# Patient Record
Sex: MALE | Race: BLACK OR AFRICAN AMERICAN | NOT HISPANIC OR LATINO
[De-identification: names, ages, dates, MRNs, and addresses within clinical notes are randomized per-mention and may not be internally consistent; named-entity substitution may affect disease eponyms.]

---

## 2021-03-05 PROBLEM — Z00.00 ENCOUNTER FOR PREVENTIVE HEALTH EXAMINATION: Status: ACTIVE | Noted: 2021-03-05

## 2021-03-19 ENCOUNTER — APPOINTMENT (OUTPATIENT)
Dept: UROLOGY | Facility: CLINIC | Age: 41
End: 2021-03-19
Payer: MEDICAID

## 2021-03-19 VITALS — HEART RATE: 72 BPM | DIASTOLIC BLOOD PRESSURE: 81 MMHG | SYSTOLIC BLOOD PRESSURE: 145 MMHG

## 2021-03-19 VITALS
DIASTOLIC BLOOD PRESSURE: 100 MMHG | HEART RATE: 67 BPM | BODY MASS INDEX: 33.27 KG/M2 | WEIGHT: 207 LBS | HEIGHT: 66 IN | SYSTOLIC BLOOD PRESSURE: 139 MMHG | TEMPERATURE: 98.2 F

## 2021-03-19 DIAGNOSIS — E29.1 TESTICULAR HYPOFUNCTION: ICD-10-CM

## 2021-03-19 DIAGNOSIS — M43.10 SPONDYLOLISTHESIS, SITE UNSPECIFIED: ICD-10-CM

## 2021-03-19 DIAGNOSIS — Z86.79 PERSONAL HISTORY OF OTHER DISEASES OF THE CIRCULATORY SYSTEM: ICD-10-CM

## 2021-03-19 PROCEDURE — 99204 OFFICE O/P NEW MOD 45 MIN: CPT

## 2021-03-19 PROCEDURE — 99072 ADDL SUPL MATRL&STAF TM PHE: CPT

## 2021-03-19 NOTE — HISTORY OF PRESENT ILLNESS
[FreeTextEntry1] : 41M comes in for fertility evaluaiton. failure to conceive x 9 months. girlfriend is 42F. 3 children for girlfreiend. no male factor children. patient reports pregnagncy at age 13 ended in . no previous semen testing. negative female factor evalaution per his report. no ED. no ejauclatory dysufnciton. good libido. no additional compaltins. \par no familty history prostate kdieny bladder canc.er\par

## 2021-03-19 NOTE — PHYSICAL EXAM
[General Appearance - Well Developed] : well developed [General Appearance - Well Nourished] : well nourished [Normal Appearance] : normal appearance [Well Groomed] : well groomed [Heart Rate And Rhythm] : Heart rate and rhythm were normal [Abdomen Soft] : soft [Abdomen Tenderness] : non-tender [Abdomen Hernia] : no hernia was discovered [Urethral Meatus] : meatus normal [Penis Abnormality] : normal uncircumcised penis [Urinary Bladder Findings] : the bladder was normal on palpation [Scrotum] : the scrotum was normal [FreeTextEntry1] : 18-20cc frim testes bialterally. ?grade I left varicocele.

## 2021-03-19 NOTE — ASSESSMENT
[FreeTextEntry1] : hypogonadism\par partner is 42\par recommend ART\par small varioccele\par hormonal eval\par semen analysis\par scrotal ultraound\par f/u 1 motnh

## 2021-03-21 ENCOUNTER — OUTPATIENT (OUTPATIENT)
Dept: OUTPATIENT SERVICES | Facility: HOSPITAL | Age: 41
LOS: 1 days | End: 2021-03-21
Payer: MEDICAID

## 2021-03-21 ENCOUNTER — RESULT REVIEW (OUTPATIENT)
Age: 41
End: 2021-03-21

## 2021-03-21 ENCOUNTER — APPOINTMENT (OUTPATIENT)
Dept: ULTRASOUND IMAGING | Facility: HOSPITAL | Age: 41
End: 2021-03-21
Payer: MEDICAID

## 2021-03-21 PROCEDURE — 93975 VASCULAR STUDY: CPT | Mod: 26

## 2021-03-21 PROCEDURE — 76870 US EXAM SCROTUM: CPT

## 2021-03-21 PROCEDURE — 93975 VASCULAR STUDY: CPT

## 2021-03-21 PROCEDURE — 76870 US EXAM SCROTUM: CPT | Mod: 26

## 2021-03-22 LAB
ESTRADIOL SERPL-MCNC: 32 PG/ML
FSH SERPL-MCNC: 4.8 IU/L
LH SERPL-ACNC: 4.8 IU/L

## 2021-03-26 LAB
TESTOST BND SERPL-MCNC: 19.4 PG/ML
TESTOST SERPL-MCNC: 511.6 NG/DL

## 2021-04-23 ENCOUNTER — APPOINTMENT (OUTPATIENT)
Dept: UROLOGY | Facility: CLINIC | Age: 41
End: 2021-04-23